# Patient Record
Sex: MALE | Race: WHITE | Employment: OTHER | URBAN - METROPOLITAN AREA
[De-identification: names, ages, dates, MRNs, and addresses within clinical notes are randomized per-mention and may not be internally consistent; named-entity substitution may affect disease eponyms.]

---

## 2019-10-15 ENCOUNTER — OFFICE VISIT (OUTPATIENT)
Dept: NEUROLOGY | Age: 49
End: 2019-10-15

## 2019-10-15 VITALS
RESPIRATION RATE: 18 BRPM | BODY MASS INDEX: 22.88 KG/M2 | DIASTOLIC BLOOD PRESSURE: 66 MMHG | HEART RATE: 68 BPM | WEIGHT: 151 LBS | TEMPERATURE: 96.3 F | HEIGHT: 68 IN | SYSTOLIC BLOOD PRESSURE: 104 MMHG

## 2019-10-15 DIAGNOSIS — S06.9X1A TRAUMATIC BRAIN INJURY, WITH LOSS OF CONSCIOUSNESS OF 30 MINUTES OR LESS, INITIAL ENCOUNTER (HCC): ICD-10-CM

## 2019-10-15 DIAGNOSIS — S06.331A: ICD-10-CM

## 2019-10-15 DIAGNOSIS — F31.77 BIPOLAR DISORDER, IN PARTIAL REMISSION, MOST RECENT EPISODE MIXED (HCC): Primary | ICD-10-CM

## 2019-10-15 DIAGNOSIS — Z55.0 LITERACY LEVEL OF ILLITERATE: ICD-10-CM

## 2019-10-15 RX ORDER — PRAZOSIN HYDROCHLORIDE 1 MG/1
1 CAPSULE ORAL
COMMUNITY

## 2019-10-15 RX ORDER — VENLAFAXINE 75 MG/1
25 TABLET ORAL 3 TIMES DAILY
COMMUNITY
End: 2020-06-18

## 2019-10-15 RX ORDER — VARENICLINE TARTRATE 1 MG/1
1 TABLET, FILM COATED ORAL
COMMUNITY

## 2019-10-15 RX ORDER — ALBUTEROL SULFATE 2.5 MG/.5ML
2.5 SOLUTION RESPIRATORY (INHALATION)
COMMUNITY

## 2019-10-15 RX ORDER — RISPERIDONE 2 MG/1
2 TABLET, FILM COATED ORAL
COMMUNITY

## 2019-10-15 SDOH — EDUCATIONAL SECURITY - EDUCATION ATTAINMENT: ILITERACY AND LOW LEVEL LITERACY: Z55.0

## 2019-10-15 NOTE — PATIENT INSTRUCTIONS
Thank you for choosing [employer] and [facility] for your     care. You may receive a survey about your visit. We appreciate you taking time     to complete this survey as we use your feedback to improve our services. We     realize we are not perfect, but we strive to provide excellent care.

## 2019-10-15 NOTE — LETTER
10/15/2019 3:50 PM 
 
Patient:  Antonio Ramirez. YOB: 1970 Date of Visit: 10/15/2019 Dear José Cornell., MD 
[address] [place] [id number] VIA Facsimile: [phone] 
 : Thank you for referring Mr. Snehal Ledesma to me for evaluation/treatment. Below are the relevant portions of my assessment and plan of care. If you have questions, please do not hesitate to call me. I look forward to following Mr. Trinh Bryant along with you.  
 
 
 
Sincerely, 
 
 
Jeimy Villagomez MD
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
## 2019-10-18 ENCOUNTER — OFFICE VISIT (OUTPATIENT)
Dept: CARDIOLOGY CLINIC | Age: 49
End: 2019-10-18

## 2019-10-18 VITALS
BODY MASS INDEX: 23.19 KG/M2 | SYSTOLIC BLOOD PRESSURE: 100 MMHG | HEIGHT: 68 IN | OXYGEN SATURATION: 97 % | WEIGHT: 153 LBS | DIASTOLIC BLOOD PRESSURE: 60 MMHG | HEART RATE: 69 BPM

## 2019-10-18 DIAGNOSIS — Q21.12 PFO (PATENT FORAMEN OVALE): ICD-10-CM

## 2019-10-18 DIAGNOSIS — F31.77 BIPOLAR DISORDER, IN PARTIAL REMISSION, MOST RECENT EPISODE MIXED (HCC): ICD-10-CM

## 2019-10-18 DIAGNOSIS — R55 SYNCOPE, UNSPECIFIED SYNCOPE TYPE: Primary | ICD-10-CM

## 2019-10-18 DIAGNOSIS — Z01.810 PREOP CARDIOVASCULAR EXAM: ICD-10-CM

## 2019-10-18 NOTE — PROGRESS NOTES
Janay Redmond. presents today for   Chief Complaint   Patient presents with    New Patient     self referred for syncope - having tumor removal on 11/1/19 at [facility] with Dr. Jake Betancourt and Dr. Tracy Delaney of [facility]     exertion       Janay Redmond. preferred language for health care discussion is english/other. Is someone accompanying this pt? wife    Is the patient using any DME equipment during [address]? no    Depression Screening:  3 most recent PHQ Screens 10/18/2019   Little interest or pleasure in doing things Not at all   Feeling down, depressed, irritable, or hopeless Not at all   Total Score PHQ 2 0       Learning Assessment:  Learning Assessment 10/18/2019   PRIMARY LEARNER Patient   PRIMARY LANGUAGE ENGLISH   LEARNER PREFERENCE PRIMARY DEMONSTRATION   ANSWERED BY Patient   RELATIONSHIP SELF       Abuse Screening:  Abuse Screening Questionnaire 10/18/2019   Do you ever feel afraid of your partner? N   Are you in a relationship with someone who physically or mentally threatens you? N   Is it safe for you to go home? Y       Fall Risk  Fall Risk Assessment, last 12 mths 10/18/2019   Able to walk? Yes   Fall in past 12 months? Yes   Fall with injury? Yes   Number of falls in past 12 months 1   Fall Risk Score 2       Pt currently taking Anticoagulant therapy? no    Coordination of Care:  1. Have you been to the ER, urgent care clinic since your last visit? Hospitalized since your last visit? 7/17 - 7/19 for syncope     2. Have you seen or consulted any other health care providers outside of the [address] [place] [address] Ken since your last visit? Include any pap smears or colon screening.  no

## 2019-10-18 NOTE — LETTER
10/18/2019 9:37 AM 
 
Mr. Lee Shirley. 
[address] [facility] [address] [address] Lee Shirley. was seen in our office on 10/18/2019 for cardiac evaluation. From a cardiac standpoint he is low risk for Neuro surgery. Please feel free to contact our office if you have any questions regarding this patient. Sincerely, Bibiana Marin MD

## 2019-10-18 NOTE — PROGRESS NOTES
HISTORY OF PRESENT ILLNESS  Agusto Caballero is a 44 y.o. male. HPI    Patient presents for a new office visit. He was referred here for preoperative cardiac risk stratification prior to undergoing a tumor removal which is felt to be a possible vestibular schwannoma. This is to be a combined  procedure with neurosurgery and ENT at the beginning of next month at [facility].    The patient was briefly hospitalized at [facility] in July 2019 following a fall which was felt to be related to a syncopal episode. The patient states that he was getting up out of bed, went to walk into the kitchen when he felt really dizzy and then fell backwards. He notes he did lose consciousness for less than a minute. He was found to have a small subdural hemorrhage which was managed conservatively. There was an incidental finding of a brain mass which will need to be surgically removed. During his hospital stay, he underwent an echocardiogram which demonstrated a very small patent foramen ovale, normal LV size and systolic function, EF 71%, normal right-sided heart chambers and no evidence of pulmonary hypertension. He also underwent a carotid duplex scan which was negative. He was seen by cardiology during his hospital stay which recommended outpatient follow-up. Since hospital discharge, the patient states he has been feeling well. No recurrent syncope or near syncope. He does complain of occasional shortness of breath but is a longtime smoker. He did recently have pulmonary function test which demonstrated COPD. He denies any chest pain, no leg swelling, no orthopnea or PND. No claudication. No major change in his activity tolerance. He is able to climb up a flight or 2 of stairs without difficulty.     Past Medical History:   Diagnosis Date    Depression     Headache     Psychiatric disorder     Seizures (HCC)      Current Outpatient Medications   Medication Sig Dispense Refill  OXYGEN-AIR DELIVERY SYSTEMS 2 L by Does Not Apply route continuous.  albuterol sulfate (PROVENTIL;VENTOLIN) 2.5 mg/0.5 mL nebu nebulizer solution 2.5 mg by Nebulization route once.  albuterol sulfate (PROVENTIL IN) Take 2 Puffs by inhalation every four to six (4-6) hours as needed.  varenicline (CHANTIX) 1 mg tablet Take 1 mg by mouth two (2) times daily (after meals).  divalproex sodium (DIVALPROEX PO) Take 500 mg by mouth nightly.  risperiDONE (RISPERDAL) 2 mg tablet Take 2 mg by mouth.  venlafaxine (EFFEXOR) 75 mg tablet Take 25 mg by mouth three (3) times daily.  tiotropium Br/olodaterol HCl (STIOLTO RESPIMAT IN) Take 2 Puffs by inhalation daily.  prazosin (MINIPRESS) 1 mg capsule Take 1 mg by mouth nightly. Allergies   Allergen Reactions    Penicillins Hives      Social History     Tobacco Use    Smoking status: Current Some Day Smoker     Packs/day: 0.25     Years: 30.00     Pack years: 7.50     Types: Cigarettes    Smokeless tobacco: Never Used   Substance Use Topics    Alcohol use: Not Currently    Drug use: No     Family History   Problem Relation Age of Onset    Dementia Father     Cancer Maternal Aunt     Cancer Maternal Grandmother          Review of Systems   Constitutional: Negative for chills, fever and weight loss. HENT: Negative for nosebleeds. Eyes: Negative for blurred vision and double vision. Respiratory: Positive for shortness of breath. Negative for cough and wheezing. Cardiovascular: Negative for chest pain, palpitations, orthopnea, claudication, leg swelling and PND. Gastrointestinal: Negative for abdominal pain, heartburn, nausea and vomiting. Genitourinary: Negative for dysuria and hematuria. Musculoskeletal: Negative for falls and myalgias. Skin: Negative for rash. Neurological: Negative for dizziness, focal weakness and headaches. Endo/Heme/Allergies: Does not bruise/bleed easily.    Psychiatric/Behavioral: Negative for substance abuse. Visit Vitals  /60 (BP 1 Location: Left arm, BP Patient Position: Sitting)   Pulse 69   Ht 5' 8\" (1.727 m)   Wt 69.4 kg (153 lb)   SpO2 97%   BMI 23.26 kg/m²       Physical Exam   Constitutional: He is oriented to person, place, and time. He appears well-developed and well-nourished. HENT:   Head: Normocephalic and atraumatic. Eyes: Conjunctivae are normal.   Neck: Neck supple. No JVD present. Carotid bruit is not present. Cardiovascular: Normal rate, regular rhythm, S1 normal, S2 normal and normal pulses. Exam reveals no gallop and no S3. No murmur heard. Pulmonary/Chest: He has decreased breath sounds. He has no wheezes. He has no rhonchi. He has no rales. Abdominal: Soft. Bowel sounds are normal. There is no tenderness. Musculoskeletal: He exhibits no edema, tenderness or deformity. Neurological: He is alert and oriented to person, place, and time. Skin: Skin is warm and dry. Psychiatric: He has a normal mood and affect. His behavior is normal. Thought content normal.     July 16, 2019 EKG: Normal sinus rhythm, normal axis, normal QTc interval, no ST or T wave changes concerning for ischemia. This is unchanged compared to prior tracing from 2018. ASSESSMENT and PLAN  Encounter Diagnoses   Name Primary?  Syncope, unspecified syncope type Yes    Bipolar disorder, in partial remission, most recent episode mixed (HCC)     PFO (patent foramen ovale)     Preop cardiovascular exam      Low risk from a cardiac standpoint to proceed with ENT/neurosurgery as scheduled next month. No further cardiac testing needed. History of syncope. I suspect this was either a vasovagal episode or possibly due to orthostatic hypotension. His blood pressure is lower limits of normal at baseline, so I recommend staying well-hydrated and adding salt to his diet.   He underwent an echocardiogram during his hospital stay which was essentially normal with the exception of an incidental finding of a small PFO. Small patent foramen ovale. This was an incidental finding on an echocardiogram in July 2019. Patient only had a tiny shunting of blood with Valsalva only. No indication for closure at this time. I reassured the patient that this should not cause him any problems in the future. Vestibular schwannoma. Surgery scheduled in 2 weeks as described above. Tobacco abuse disorder. Patient is actively trying to quit. He was encouraged for complete smoking cessation. Patient can follow-up in the future as needed.

## 2019-10-25 ENCOUNTER — CLINICAL SUPPORT (OUTPATIENT)
Dept: PULMONOLOGY | Age: 49
End: 2019-10-25

## 2019-10-25 VITALS — BODY MASS INDEX: 23.04 KG/M2 | WEIGHT: 152 LBS | HEIGHT: 68 IN

## 2019-10-25 DIAGNOSIS — Z01.818 PRE-OPERATIVE CLEARANCE: ICD-10-CM

## 2019-10-25 DIAGNOSIS — J44.9 CHRONIC OBSTRUCTIVE PULMONARY DISEASE, UNSPECIFIED COPD TYPE (HCC): Primary | ICD-10-CM

## 2019-10-25 NOTE — PROGRESS NOTES
Verbal Order with read back per Nabeel Bryant MD  For PFT smart panel. AMB POC PFT complete w/ bronchodilator  AMB POC PFT complete w/o bronchodilator    Dr. María Wilde MD will co-sign the orders.

## 2019-10-28 ENCOUNTER — OFFICE VISIT (OUTPATIENT)
Dept: PULMONOLOGY | Age: 49
End: 2019-10-28

## 2019-10-28 VITALS
RESPIRATION RATE: 20 BRPM | HEIGHT: 68 IN | SYSTOLIC BLOOD PRESSURE: 124 MMHG | TEMPERATURE: 96.5 F | HEART RATE: 72 BPM | BODY MASS INDEX: 22.94 KG/M2 | DIASTOLIC BLOOD PRESSURE: 76 MMHG | OXYGEN SATURATION: 98 % | WEIGHT: 151.4 LBS

## 2019-10-28 DIAGNOSIS — J44.9 CHRONIC OBSTRUCTIVE PULMONARY DISEASE, UNSPECIFIED COPD TYPE (HCC): Primary | ICD-10-CM

## 2019-10-28 DIAGNOSIS — Z01.818 PRE-OPERATIVE CLEARANCE: ICD-10-CM

## 2019-10-28 RX ORDER — DEXTROMETHORPHAN HYDROBROMIDE, GUAIFENESIN 5; 100 MG/5ML; MG/5ML
650 LIQUID ORAL 3 TIMES DAILY
COMMUNITY

## 2019-10-28 RX ORDER — POLYETHYLENE GLYCOL 3350 17 G/17G
17 POWDER, FOR SOLUTION ORAL EVERY EVENING
COMMUNITY
Start: 2020-01-20 | End: 2020-01-20
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
## 2019-10-28 NOTE — PROGRESS NOTES
[facility]  Pulmonary, Critical Care, and Sleep Medicine      Dear Dr. Ervin Mathias,    Chief complaint:  Medical clearance for neuro surgery and COPD    HPI:  Corine Hadley. is 44years old and comes to the office today at your request concerning an evaluation for COPD and a medical clearance for neurosurgery scheduled later this week. The patient relates that he takes Stiolto faithfully daily and uses albuterol. He reports shortness of breath with exertion with activities like walking upstairs but is able to walk to a store without significant dyspnea. He denies chest pain and reports a chronic cough productive of clear mucus without purulence of blood. He denies leg swelling or leg pain. He does report sleep issues and is going to have an evaluation for sleep apnea in the near future. Allergies   Allergen Reactions    Penicillins Hives     Current Outpatient Medications   Medication Sig    acetaminophen (TYLENOL) 650 mg TbER Take 650 mg by mouth three (3) times daily.  polyethylene glycol (MIRALAX) 17 gram/dose powder Take 17 g by mouth every evening.  OXYGEN-AIR DELIVERY SYSTEMS 2 L by Does Not Apply route continuous. [employer]    albuterol sulfate (PROVENTIL;VENTOLIN) 2.5 mg/0.5 mL nebu nebulizer solution 2.5 mg by Nebulization route once.  albuterol sulfate (PROVENTIL IN) Take 2 Puffs by inhalation every four to six (4-6) hours as needed.  varenicline (CHANTIX) 1 mg tablet Take 1 mg by mouth two (2) times daily (after meals).  divalproex sodium (DIVALPROEX PO) Take 500 mg by mouth nightly.  risperiDONE (RISPERDAL) 2 mg tablet Take 2 mg by mouth nightly.  venlafaxine (EFFEXOR) 75 mg tablet Take 25 mg by mouth three (3) times daily.  tiotropium Br/olodaterol HCl (STIOLTO RESPIMAT IN) Take 2 Puffs by inhalation daily.  prazosin (MINIPRESS) 1 mg capsule Take 1 mg by mouth nightly. No current facility-administered medications for this visit. 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 but was provided a respirator and protective clothing most of the time    Review of systems:  He denies fever chills poor appetite weight loss trouble swallowing chronic abdominal pain melena blood in his stools dysuria hematuria rashes and reports arthritis particularly in his shoulders syncope recently and dizziness as well as hearing loss and visual disturbances and 2 episodes of seizures    Physical Exam:  Visit Vitals  /76 (BP 1 Location: Left arm, BP Patient Position: Sitting)   Pulse 72   Temp 96.5 °F (35.8 °C)   Resp 20   Ht 5' 8\" (1.727 m)   Wt 68.7 kg (151 lb 6.4 oz)   SpO2 98%   BMI 23.02 kg/m²     Well-developed well-nourished  HEENT: pupils equal, reactive, sclera, non-icteric   Oropharynx tongue: normal   Neck: Supple   Lymph Nodes: Supra clavicular and cervical nodes, negative   Chest: Equal symmetrical expansion, no dullness, no wheezes, rales or rubs   Heart: Regular, rhythm without william or murmur no carotid bruits  Abdomen: soft, non-tender no masses or organomegaly Extremities: no cyanosis, clubbing, no edema no calf tenderness  Musculoskeletal: No acute joint inflammation or muscle wasting  Skin: No rash   Neurological: alert, oriented, moves all extremities      LABS:  O2 sat room air at rest 98% and O2 sat after walking 660 m remained at 97% on room air  Spirometry 10/25/2019: Moderate obstructive lung defect no improvement with bronchodilator  Chest x-ray: 10/29/2019: Personally reviewed, findings for COPD as well as a thick linear infiltrate right upper lobe with a radiolucent area above with volume loss in the right upper lung and a report of the chest x-ray from 11 months earlier noting a similar abnormality  Impression:   By history and physical exam and oximetry patient COPD appears moderate and stable at this time  The above indicates that the patient can undergo the post neurosurgery. the chest x-ray suggests a possible previous injury or infection to the right upper lobe but appears to be stable related to the report of the chest x-ray 11 months ago and since the patient has no fever or significant cough to suggest an active infection I do not find that the chest x-ray appearance is a contraindication to surgery  Plan:  Continue Stiolto and as needed albuterol  Discontinue oxygen in the daytime with normal activity and continue supplemental oxygen with sleep until sleep study information available  Continue his excellent efforts to discontinue smoking which will be most important for successful recovery from surgery from a respiratory standpoint  Patient did have an echocardiogram several months ago which revealed an elevated right atrial pressure of 8 mmHg however since then he has had a cardiology evaluation in which he was given medical clearance from a cardiology standpoint for surgery  Therefore I see no contraindication from a pulmonary standpoint for the proposed neurosurgery.     Thanks for allowing me to share in this patient's evaluation    Sincerely,      Jenn Bailey MD , [facility]    CC: MD Keith Kennedy MD     [address] [facility], [address] [facility] [address]     P: [phone]     F: [phone]

## 2019-10-28 NOTE — PATIENT INSTRUCTIONS
Continue Stiolto  Continue albuterol 2 puffs every 4 hours as needed and if you require albuterol too often to control respiratory symptoms call the office    Continue supplemental oxygen with sleep for now until sleep study
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
## 2019-12-09 ENCOUNTER — HOSPITAL ENCOUNTER (OUTPATIENT)
Dept: NEUROLOGY | Age: 49
Discharge: HOME | End: 2019-12-09
Attending: PSYCHIATRY & NEUROLOGY
Payer: COMMERCIAL

## 2019-12-09 DIAGNOSIS — S06.9X1A TRAUMATIC BRAIN INJURY, WITH LOSS OF CONSCIOUSNESS OF 30 MINUTES OR LESS, INITIAL ENCOUNTER (HCC): ICD-10-CM

## 2019-12-09 PROCEDURE — 95819 EEG AWAKE AND ASLEEP: CPT

## 2019-12-10 NOTE — PROCEDURES
[address] [place] [address]   EEG    Name:  Windy Hirsch  MR#:   [id number]  :  1970  ACCOUNT #:  [de-identified]  DATE OF SERVICE:  2019    REFERRING PROVIDER:  Robin Tesfaye MD    EEG NUMBER:  [place]     CLINICAL HISTORY:  A 58-year-old male with history of cognitive impairment. MEDICATIONS:  Albuterol, Chantix, valproic acid, risperidone, venlafaxine, prazosin. EEG REPORT:  This is a 16-channel routine EEG done using the international 10-20 electrode placement system. The predominant background consists of 8 Hz posterior predominant and regular activity, which attenuate with eye opening. As the recording evolves, there is evolution of vertex waves seen centrally as well as some sleep spindles consistent with stage 2 sleep. There were no abnormalities with photic stimulation. Hyperventilation was not performed. There were no epileptiform abnormalities. IMPRESSION:  This is a normal awake, drowsy, and asleep EEG.       Jabier Arroyo MD      [id number]  D:  2019 15:24  T:  12/10/2019 1:39  JOB #:  [id number]

## 2019-12-12 ENCOUNTER — OFFICE VISIT (OUTPATIENT)
Dept: NEUROLOGY | Age: 49
End: 2019-12-12

## 2019-12-12 DIAGNOSIS — Z65.8 PSYCHOSOCIAL DISTRESS: ICD-10-CM

## 2019-12-12 DIAGNOSIS — R41.844 EXECUTIVE FUNCTION DEFICIT: ICD-10-CM

## 2019-12-12 DIAGNOSIS — F19.10 POLYSUBSTANCE ABUSE (HCC): ICD-10-CM

## 2019-12-12 DIAGNOSIS — R41.3 MEMORY LOSS: Primary | ICD-10-CM

## 2019-12-12 DIAGNOSIS — R47.89 WORD FINDING DIFFICULTY: ICD-10-CM

## 2019-12-12 DIAGNOSIS — F81.9 LEARNING DIFFICULTY DUE TO COGNITIVE LIMITATIONS: ICD-10-CM

## 2019-12-12 DIAGNOSIS — R41.9 DEFICIT IN COMPREHENSION: ICD-10-CM

## 2019-12-12 DIAGNOSIS — R41.840 ATTENTION AND CONCENTRATION DEFICIT: ICD-10-CM

## 2019-12-23 ENCOUNTER — OFFICE VISIT (OUTPATIENT)
Dept: NEUROLOGY | Age: 49
End: 2019-12-23

## 2019-12-23 VITALS
HEART RATE: 81 BPM | RESPIRATION RATE: 20 BRPM | WEIGHT: 156.8 LBS | HEIGHT: 68 IN | BODY MASS INDEX: 23.76 KG/M2 | TEMPERATURE: 97.5 F | DIASTOLIC BLOOD PRESSURE: 70 MMHG | SYSTOLIC BLOOD PRESSURE: 98 MMHG

## 2019-12-23 DIAGNOSIS — F19.10 POLYSUBSTANCE ABUSE (HCC): ICD-10-CM

## 2019-12-23 DIAGNOSIS — R41.3 MEMORY LOSS: Primary | ICD-10-CM

## 2019-12-23 DIAGNOSIS — F81.9 LEARNING DIFFICULTY DUE TO COGNITIVE LIMITATIONS: ICD-10-CM

## 2019-12-23 DIAGNOSIS — R41.9 DEFICIT IN COMPREHENSION: ICD-10-CM

## 2019-12-23 DIAGNOSIS — R47.89 WORD FINDING DIFFICULTY: ICD-10-CM

## 2019-12-23 DIAGNOSIS — R41.840 ATTENTION AND CONCENTRATION DEFICIT: ICD-10-CM

## 2019-12-23 DIAGNOSIS — R41.844 EXECUTIVE FUNCTION DEFICIT: ICD-10-CM

## 2019-12-23 RX ORDER — DIAZEPAM 5 MG/1
TABLET ORAL
COMMUNITY
Start: 2020-01-20 | End: 2020-01-20

## 2019-12-23 NOTE — PROGRESS NOTES
Re:  Iwona Cook ,Follow up visit     12/23/2019 9:46 AM    SSN: [id number]    Subjective:   Negar Mayers. returns for follow up. He has new complaints of right facial numbness which happens after the surgery. Still with significant imbalance after the trauma. Medications:    Current Outpatient Medications   Medication Sig Dispense Refill    tobramycin-dexamethasone (TOBRADEX) ophthalmic ointment TobraDex 0.3 %-0.1 % eye ointment      Carboxymethylcellulose-Glycern (REFRESH OPTIVE) 0.5-0.9 % drop Apply  to eye.  acetaminophen (TYLENOL) 650 mg TbER Take 650 mg by mouth three (3) times daily.  polyethylene glycol (MIRALAX) 17 gram/dose powder Take 17 g by mouth every evening.  OXYGEN-AIR DELIVERY SYSTEMS 2 L by Does Not Apply route continuous. First Choice O2      albuterol sulfate (PROVENTIL;VENTOLIN) 2.5 mg/0.5 mL nebu nebulizer solution 2.5 mg by Nebulization route once.  albuterol sulfate (PROVENTIL IN) Take 2 Puffs by inhalation every four to six (4-6) hours as needed.  varenicline (CHANTIX) 1 mg tablet Take 1 mg by mouth two (2) times daily (after meals).  divalproex sodium (DIVALPROEX PO) Take 500 mg by mouth nightly.  risperiDONE (RISPERDAL) 2 mg tablet Take 2 mg by mouth nightly.  venlafaxine (EFFEXOR) 75 mg tablet Take 25 mg by mouth three (3) times daily.  tiotropium Br/olodaterol HCl (STIOLTO RESPIMAT IN) Take 2 Puffs by inhalation daily.  prazosin (MINIPRESS) 1 mg capsule Take 1 mg by mouth nightly.  diazePAM (VALIUM) 5 mg tablet Valium 5 mg tablet   Take 1 tablet twice a day by oral route as needed.          Vital signs:    Visit Vitals  BP 98/70 (BP 1 Location: Left arm, BP Patient Position: Sitting)   Pulse 81   Temp 97.5 °F (36.4 °C) (Oral)   Resp 20   Ht 5' 8\" (1.727 m)   Wt 71.1 kg (156 lb 12.8 oz)   BMI 23.84 kg/m²       Review of Systems:   As above otherwise 11 point review of systems negative including; Constitutional no fever or chills  Skin denies rash or itching  HEENT  Denies tinnitus, hearing lose  Eyes denies diplopia vision lose  Respiratory denies sortness of breath  Cardiovascular denies chest pain, dyspnea on exertion  Gastrointestinal denies nausea, vomiting, diarrhea, constipation  Genitourinary denies incontinence  Musculoskeletal denies joint pain or swelling  Endocrine denies weight change  Hematology denies easy bruising or bleeding   Neurological as above in HPI      Patient Active Problem List   Diagnosis Code    [facility], [facility] ([facility] F31.9         Objective: The patient is awake, alert, and oriented x 4. Fund of knowledge is adequate. Speech is fluent and memory is intact. Cranial Nerves: II - Visual fields are full to confrontation. III, IV, VI - Extraocular movements are intact. There is no nystagmus. V - Facial sensation is intact to pinprick. VII - Face is symmetrical.  VIII - Hearing is present. IX, X, XII - Palate is symmetrical.   XI - Shoulder shrugging and head turning intact  Motor: The patient moves all four limbs fairly well and symmetrically. Tone is normal. Reflexes are 2+ and symmetrical. Plantars are down going. Gait is wide based and unsteady. [facility]  EEG     Name:  Jenniffer Lugo  MR#:   [id number]  :  1970  ACCOUNT #:  [de-identified]  DATE OF SERVICE:  2019     REFERRING PROVIDER:  Branden Ball MD     EEG NUMBER:  [facility]      CLINICAL HISTORY:  A 78-year-old male with history of cognitive impairment.     MEDICATIONS:  Albuterol, Chantix, valproic acid, risperidone, venlafaxine, prazosin.     EEG REPORT:  This is a 16-channel routine EEG done using the international 10-20 electrode placement system. The predominant background consists of 8 Hz posterior predominant and regular activity, which attenuate with eye opening.   As the recording evolves, there is evolution of vertex waves seen centrally as well as some sleep spindles consistent with stage 2 sleep. There were no abnormalities with photic stimulation. Hyperventilation was not performed. There were no epileptiform abnormalities.     IMPRESSION:  This is a normal awake, drowsy, and asleep EEG.       Karan Childs MD    CBC:   Lab Results   Component Value Date/Time    WBC 8.7 04/22/2016 09:58 AM    RBC 4.46 04/22/2016 09:58 AM    HGB 13.8 04/22/2016 09:58 AM    HCT 43.6 04/22/2016 09:58 AM    PLATELET 182 21/94/2019 09:58 AM     BMP:   Lab Results   Component Value Date/Time    Glucose 85 04/22/2016 09:58 AM    Sodium 142 04/22/2016 09:58 AM    Potassium 3.9 04/22/2016 09:58 AM    Chloride 105 04/22/2016 09:58 AM    CO2 32 04/22/2016 09:58 AM    BUN 9 04/22/2016 09:58 AM    Creatinine 0.8 04/22/2016 09:58 AM    Calcium 9.1 04/22/2016 09:58 AM     CMP:   Lab Results   Component Value Date/Time    Glucose 85 04/22/2016 09:58 AM    Sodium 142 04/22/2016 09:58 AM    Potassium 3.9 04/22/2016 09:58 AM    Chloride 105 04/22/2016 09:58 AM    CO2 32 04/22/2016 09:58 AM    BUN 9 04/22/2016 09:58 AM    Creatinine 0.8 04/22/2016 09:58 AM    Calcium 9.1 04/22/2016 09:58 AM    Anion gap 18 09/10/2014 02:28 PM     Coagulation: No results found for: PTP, INR, APTT, PTTT, INREXT  Cardiac markers: No results found for: CPK, CKND1, SAMREEN    Assessment:  Numerous complaints post traumatic. Nothing particularly different at this time. Finally in with a psychiatrist.       Plan:  Await neuro-psych evaluation. Needs to be in with psychiatry and psychology. Return after neuro-psych testing is done in February. Sincerely,        Suma Stout.  Dick Chirinos M.D.

## 2020-01-06 ENCOUNTER — APPOINTMENT (OUTPATIENT)
Dept: PHYSICAL THERAPY | Age: 50
End: 2020-01-06

## 2020-01-20 ENCOUNTER — OFFICE VISIT (OUTPATIENT)
Dept: PULMONOLOGY | Age: 50
End: 2020-01-20

## 2020-01-20 VITALS
WEIGHT: 160.2 LBS | RESPIRATION RATE: 18 BRPM | HEART RATE: 72 BPM | BODY MASS INDEX: 24.28 KG/M2 | HEIGHT: 68 IN | TEMPERATURE: 96.5 F | DIASTOLIC BLOOD PRESSURE: 74 MMHG | OXYGEN SATURATION: 98 % | SYSTOLIC BLOOD PRESSURE: 126 MMHG

## 2020-01-20 DIAGNOSIS — J44.9 CHRONIC OBSTRUCTIVE PULMONARY DISEASE, UNSPECIFIED COPD TYPE (HCC): Primary | ICD-10-CM

## 2020-01-20 DIAGNOSIS — G47.9 SLEEP DISORDER: ICD-10-CM

## 2020-01-20 DIAGNOSIS — Z87.891 FORMER CIGARETTE SMOKER: ICD-10-CM

## 2020-01-20 DIAGNOSIS — J94.1 FIBROTHORAX: ICD-10-CM

## 2020-01-20 RX ORDER — QUETIAPINE FUMARATE 25 MG/1
100 TABLET, FILM COATED ORAL
Status: ON HOLD | COMMUNITY
Start: 2020-01-09 | End: 2020-06-19

## 2020-01-20 RX ORDER — NALOXONE HYDROCHLORIDE 4 MG/.1ML
1 SPRAY NASAL
COMMUNITY
Start: 2019-11-06 | End: 2020-01-20

## 2020-01-20 RX ORDER — OXYCODONE AND ACETAMINOPHEN 5; 325 MG/1; MG/1
TABLET ORAL
Refills: 0 | COMMUNITY
Start: 2019-11-29 | End: 2020-01-20

## 2020-01-20 RX ORDER — DOCUSATE SODIUM 100 MG
CAPSULE ORAL
COMMUNITY
Start: 2019-12-27 | End: 2020-01-20

## 2020-01-20 RX ORDER — OMEPRAZOLE 20 MG/1
40 CAPSULE, DELAYED RELEASE ORAL DAILY
COMMUNITY

## 2020-01-20 RX ORDER — DOCUSATE SODIUM 100 MG/1
CAPSULE, LIQUID FILLED ORAL
COMMUNITY
Start: 2019-12-27 | End: 2020-01-20

## 2020-01-20 RX ORDER — MOXIFLOXACIN 5 MG/ML
SOLUTION/ DROPS OPHTHALMIC
Refills: 0 | COMMUNITY
Start: 2019-11-09 | End: 2020-01-20 | Stop reason: ALTCHOICE

## 2020-01-20 RX ORDER — BISACODYL 5 MG
5 TABLET, DELAYED RELEASE (ENTERIC COATED) ORAL
COMMUNITY
Start: 2019-12-27

## 2020-01-20 RX ORDER — MINERAL OIL, PETROLATUM 425; 568 MG/G; MG/G
OINTMENT OPHTHALMIC
Refills: 0 | COMMUNITY
Start: 2019-11-12 | End: 2020-01-20

## 2020-01-20 RX ORDER — IBUPROFEN 200 MG
TABLET ORAL
COMMUNITY
Start: 2020-01-20 | End: 2020-01-20

## 2020-01-20 NOTE — PATIENT INSTRUCTIONS
Continue Stiolto 2 inhalations daily    Albuterol by nebulizer every 4 hours as needed only and if you require albuterol too often to control respiratory symptoms call the office for severe symptoms go to the emergency room    Begin walking 5 to 6 minutes without stopping at least 4 times a week and gradually increase the time you walk into your walking 30 minutes 4 times a week without stopping    Call if there are any problems with scheduling related to the sleep study

## 2020-01-20 NOTE — PROGRESS NOTES
[facility]  Pulmonary, Critical Care, and Sleep Medicine      Chief complaint:  COPD sleep disorder    HPI:    Loren Huynh.    is 44years old and comes to the office today for follow-up concerning COPD and relates he continues not to smoke he denies chest pain says his cough is better and that he still has significant shortness of breath with exertion but is able to walk with stopping up to a half an hour. He also denies leg pain or swelling says he continues to use supplemental oxygen with sleep and has significant sleep problems including startling reactions and daytime sleepiness. He continues to take Stiolto in the morning and rarely uses albuterol on a as needed basis      Allergies   Allergen Reactions    Penicillins Hives and Other (comments)     Reaction occurred as a baby, mother told him he is allergic. Has not tries since. Current Outpatient Medications   Medication Sig    bisacodyL (DULCOLAX) 5 mg EC tablet Take 5 mg by mouth daily as needed.  QUEtiapine (SEROQUEL) 25 mg tablet take 1 tablet by mouth every 8 hours if needed for anxiety    omeprazole (PRILOSEC) 20 mg capsule Take 20 mg by mouth daily.  Carboxymethylcellulose-Glycern (REFRESH OPTIVE) 0.5-0.9 % drop Administer 1 Drop to both eyes two (2) times a day.  acetaminophen (TYLENOL) 650 mg TbER Take 650 mg by mouth three (3) times daily.  OXYGEN-AIR DELIVERY SYSTEMS 2 L by Does Not Apply route continuous. First Choice O2    albuterol sulfate (PROVENTIL;VENTOLIN) 2.5 mg/0.5 mL nebu nebulizer solution 2.5 mg by Nebulization route every four (4) hours as needed.  albuterol sulfate (PROVENTIL IN) Take 2 Puffs by inhalation every four to six (4-6) hours as needed.  varenicline (CHANTIX) 1 mg tablet Take 1 mg by mouth two (2) times daily (after meals).  divalproex sodium (DIVALPROEX PO) Take 500 mg by mouth three (3) times daily.     risperiDONE (RISPERDAL) 2 mg tablet Take 2 mg by mouth nightly.  venlafaxine (EFFEXOR) 75 mg tablet Take 25 mg by mouth three (3) times daily.  tiotropium Br/olodaterol HCl (STIOLTO RESPIMAT IN) Take 2 Puffs by inhalation daily.  prazosin (MINIPRESS) 1 mg capsule Take 1 mg by mouth nightly.  STOOL SOFTENER 100 mg capsule take 1 tablet by mouth once daily for constipation    REFRESH LACRI-LUBE 56.8-42.5 % ointment Administer  to both eyes.  naloxone (NARCAN) 4 mg/actuation nasal spray 1 [place] by IntraNASal route once as needed.  nicotine (NICODERM CQ) 21 mg/24 hr nicotine 21 mg/24 hr daily transdermal patch    oxyCODONE-acetaminophen (PERCOCET) 5-325 mg per tablet take 1 tablet by mouth every 6 hours if needed for pain    FIBER 0.52 gram capsule TAKE 3 CAPSULES BY MOUTH EVERY DAY    diazePAM (VALIUM) 5 mg tablet Valium 5 mg tablet   Take 1 tablet twice a day by oral route as needed.  tobramycin-dexamethasone (TOBRADEX) ophthalmic ointment Administer 1 Drop to both eyes daily.  polyethylene glycol (MIRALAX) 17 gram/dose powder Take 17 g by mouth every evening. No current facility-administered medications for this visit. 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 No pertinent surgical history.   Social History     Socioeconomic History    Marital status:      Spouse name: Not on file    Number of children: Not on file    Years of education: Not on file    Highest education level: Not on file   Occupational History    Not on file   Social Needs    Financial resource strain: Not on file    Food insecurity:     Worry: Not on file     Inability: Not on file    Transportation needs:     Medical: Not on file     Non-medical: Not on file   Tobacco Use    Smoking status: Former Smoker     Packs/day: 12.00     Years: 30.00     Pack years: 360.00     Types: Cigarettes     Start date: 3/28/1984     Last attempt to quit: 10/13/2019     Years since quittin.2    Smokeless tobacco: Never Used    Tobacco comment: no vape cigarette use   Substance and Sexual Activity    Alcohol use: Not Currently     Comment: former binge drinker    Drug use: Not Currently     Types: Cocaine, Marijuana, Heroin    Sexual activity: Not on file   Lifestyle    Physical activity:     Days per week: Not on file     Minutes per session: Not on file    Stress: Not on file   Relationships    Social connections:     Talks on phone: Not on file     Gets together: Not on file     Attends Nondenominational service: Not on file     Active member of club or organization: Not on file     Attends meetings of clubs or organizations: Not on file     Relationship status: Not on file    Intimate partner violence:     Fear of current or ex partner: Not on file     Emotionally abused: Not on file     Physically abused: Not on file     Forced sexual activity: Not on file   Other Topics Concern    Not on file   Social History Narrative    Not on file     Family History   Problem Relation Age of Onset    Dementia Father     Cancer Maternal Aunt     Cancer Maternal Grandmother        Review of systems:  He denies fever chills poor appetite or weight loss since his surgery    Physical Exam:  Visit Vitals  /74 (BP 1 Location: Right arm, BP Patient Position: Sitting)   Pulse 72   Temp 96.5 °F (35.8 °C) (Oral)   Resp 18   Ht 5' 8\" (1.727 m)   Wt 72.7 kg (160 lb 3.2 oz)   SpO2 98%   BMI 24.36 kg/m²       Well-developed well-nourished and thin  HEENT: WNL  Lymph node exam: Supraclavicular cervical lymph nodes negative  Chest: Equal symmetrical expansion no dullness no wheezes rales rubs  Heart: Regular rhythm no gallop no murmur no JVD no peripheral edema  Extremities: No cyanosis clubbing or calf tenderness  Neurological: Alert and oriented    Labs:    O2 sat room air at rest 98%    Impression:     COPD by history and physical exam stable  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  [place], [address] [address]     P: [phone]     F: [phone]

## 2020-01-20 NOTE — PROGRESS NOTES
Frandy Canales. presents today for   Chief Complaint   Patient presents with    COPD     follow up from 10/28/2019    Results     CXR 10/29/2019       Is someone accompanying this pt? Yes. Family member    Is the patient using any DME equipment during [address]? No    -DME Company N/A    Depression Screening:  3 most recent PHQ Screens 10/18/2019   Little interest or pleasure in doing things Not at all   Feeling down, depressed, irritable, or hopeless Not at all   Total Score PHQ 2 0       Learning Assessment:  Learning Assessment 10/18/2019   PRIMARY LEARNER Patient   PRIMARY LANGUAGE ENGLISH   LEARNER PREFERENCE PRIMARY DEMONSTRATION   ANSWERED BY Patient   RELATIONSHIP SELF       Abuse Screening:  Abuse Screening Questionnaire 10/18/2019   Do you ever feel afraid of your partner? N   Are you in a relationship with someone who physically or mentally threatens you? N   Is it safe for you to go home? Y       Fall Risk  Fall Risk Assessment, last 12 mths 10/18/2019   Able to walk? Yes   Fall in past 12 months? Yes   Fall with injury? Yes   Number of falls in past 12 months 1   Fall Risk Score 2         Coordination of Care:  1. Have you been to the ER, urgent care clinic since your last visit? Hospitalized since your last visit? Yes; Where: [facility], When: 11/1-11/6/2019-excision acoustic neuroma lumbar spondylosis without myelopathy    2. Have you seen or consulted any other health care providers outside of the [address] [place] [address] since your last visit? Include any pap smears or colon screening. Yes. Dr. Yong Craven, PCP    Influenza vaccine received from Dr. Harsha Wallace in  December 2019 per patient. Immunization record updated.

## 2020-01-29 ENCOUNTER — OFFICE VISIT (OUTPATIENT)
Dept: NEUROLOGY | Age: 50
End: 2020-01-29

## 2020-01-29 DIAGNOSIS — F43.10 PTSD (POST-TRAUMATIC STRESS DISORDER): ICD-10-CM

## 2020-01-29 DIAGNOSIS — F10.20 ALCOHOL USE DISORDER, SEVERE, DEPENDENCE (HCC): ICD-10-CM

## 2020-01-29 DIAGNOSIS — F19.10 POLYSUBSTANCE ABUSE (HCC): ICD-10-CM

## 2020-01-29 DIAGNOSIS — F33.3 SEVERE EPISODE OF RECURRENT MAJOR DEPRESSIVE DISORDER, WITH PSYCHOTIC FEATURES (HCC): ICD-10-CM

## 2020-01-29 DIAGNOSIS — G31.84 MILD NEUROCOGNITIVE DISORDER: Primary | ICD-10-CM

## 2020-01-29 DIAGNOSIS — Z65.8 PSYCHOSOCIAL DISTRESS: ICD-10-CM
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
## 2020-02-13 ENCOUNTER — OFFICE VISIT (OUTPATIENT)
Dept: NEUROLOGY | Age: 50
End: 2020-02-13

## 2020-02-13 DIAGNOSIS — F19.10 POLYSUBSTANCE ABUSE (HCC): ICD-10-CM

## 2020-02-13 DIAGNOSIS — F33.3 SEVERE EPISODE OF RECURRENT MAJOR DEPRESSIVE DISORDER, WITH PSYCHOTIC FEATURES (HCC): ICD-10-CM

## 2020-02-13 DIAGNOSIS — G31.84 MILD NEUROCOGNITIVE DISORDER: Primary | ICD-10-CM

## 2020-02-13 DIAGNOSIS — Z65.8 PSYCHOSOCIAL DISTRESS: ICD-10-CM

## 2020-02-13 DIAGNOSIS — F10.20 ALCOHOL USE DISORDER, SEVERE, DEPENDENCE (HCC): ICD-10-CM

## 2020-02-13 DIAGNOSIS — F43.10 PTSD (POST-TRAUMATIC STRESS DISORDER): ICD-10-CM

## 2020-02-13 NOTE — PROGRESS NOTES
Interactive Psychotherapy/office feedback        Interactive office feedback session with Mr. Karo Howard and his POA. I reviewed the results of the recent Neuropsychological Evaluation  including the observed areas of neurocognitive strengths and weaknesses. Education was provided regarding my diagnostic impressions, and treatment plan/options were discussed. I also answered numerous questions related to the clinical findings, including the various methods to improve cognition and mood. CBT, psychoeducation, and supportive psychotherapy techniques were utilized. Prior to seeing the patient I reviewed the records, including the previously completed report, the records in [place], and any updated visits from other providers since I saw the patient last.       Diagnoses:      1. Mild Cognitive Impairment  2. Post Traumatic Stress Disorder, complex, severe  3. Major Depressive Disorder, severe verus Bipolar II Disorder versus Schizoaffective Disorder  4. Alcohol Use Disorder  5. Polysubstance Use   6. Borderline and Antisocial Personality Traits  7. Psychosocial Stress                 The patient will follow up with the referring provider, and reported being very pleased with the services provided. Follow up with [facility]. [id number] psychotherapy with patient and POA. 45 minutes     This note will not be viewable in [employer]. This note was created using voice recognition software. Despite editing, there may be syntax errors.

## 2020-02-21 ENCOUNTER — TELEPHONE (OUTPATIENT)
Dept: NEUROLOGY | Age: 50
End: 2020-02-21